# Patient Record
Sex: FEMALE | Race: WHITE | NOT HISPANIC OR LATINO | Employment: UNEMPLOYED | URBAN - METROPOLITAN AREA
[De-identification: names, ages, dates, MRNs, and addresses within clinical notes are randomized per-mention and may not be internally consistent; named-entity substitution may affect disease eponyms.]

---

## 2022-11-03 ENCOUNTER — HOSPITAL ENCOUNTER (OUTPATIENT)
Facility: HOSPITAL | Age: 72
Discharge: HOME | End: 2022-11-04
Attending: GENERAL PRACTICE | Admitting: FAMILY MEDICINE
Payer: MEDICARE

## 2022-11-03 DIAGNOSIS — K56.609 SMALL BOWEL OBSTRUCTION: ICD-10-CM

## 2022-11-03 DIAGNOSIS — K52.9 ENTERITIS: Primary | ICD-10-CM

## 2022-11-03 PROBLEM — I10 HTN (HYPERTENSION): Status: ACTIVE | Noted: 2022-11-03

## 2022-11-03 PROCEDURE — 96366 THER/PROPH/DIAG IV INF ADDON: CPT

## 2022-11-03 PROCEDURE — 25000003 PHARM REV CODE 250: Performed by: GENERAL PRACTICE

## 2022-11-03 PROCEDURE — 96372 THER/PROPH/DIAG INJ SC/IM: CPT | Performed by: FAMILY MEDICINE

## 2022-11-03 PROCEDURE — 96376 TX/PRO/DX INJ SAME DRUG ADON: CPT

## 2022-11-03 PROCEDURE — 96367 TX/PROPH/DG ADDL SEQ IV INF: CPT

## 2022-11-03 PROCEDURE — G0378 HOSPITAL OBSERVATION PER HR: HCPCS

## 2022-11-03 PROCEDURE — 96375 TX/PRO/DX INJ NEW DRUG ADDON: CPT

## 2022-11-03 PROCEDURE — 99285 EMERGENCY DEPT VISIT HI MDM: CPT | Mod: 25

## 2022-11-03 PROCEDURE — 63600175 PHARM REV CODE 636 W HCPCS: Performed by: FAMILY MEDICINE

## 2022-11-03 PROCEDURE — 94761 N-INVAS EAR/PLS OXIMETRY MLT: CPT

## 2022-11-03 PROCEDURE — 25000003 PHARM REV CODE 250: Performed by: FAMILY MEDICINE

## 2022-11-03 PROCEDURE — 63600175 PHARM REV CODE 636 W HCPCS: Performed by: GENERAL PRACTICE

## 2022-11-03 PROCEDURE — S0030 INJECTION, METRONIDAZOLE: HCPCS | Performed by: GENERAL PRACTICE

## 2022-11-03 PROCEDURE — 96365 THER/PROPH/DIAG IV INF INIT: CPT

## 2022-11-03 RX ORDER — SODIUM CHLORIDE 9 MG/ML
1000 INJECTION, SOLUTION INTRAVENOUS CONTINUOUS
Status: ACTIVE | OUTPATIENT
Start: 2022-11-03 | End: 2022-11-03

## 2022-11-03 RX ORDER — SODIUM CHLORIDE 9 MG/ML
1000 INJECTION, SOLUTION INTRAVENOUS
Status: COMPLETED | OUTPATIENT
Start: 2022-11-03 | End: 2022-11-03

## 2022-11-03 RX ORDER — ENOXAPARIN SODIUM 100 MG/ML
40 INJECTION SUBCUTANEOUS EVERY 24 HOURS
Status: DISCONTINUED | OUTPATIENT
Start: 2022-11-03 | End: 2022-11-03

## 2022-11-03 RX ORDER — ALPRAZOLAM 0.25 MG/1
0.25 TABLET ORAL ONCE
Status: COMPLETED | OUTPATIENT
Start: 2022-11-03 | End: 2022-11-03

## 2022-11-03 RX ORDER — FAMOTIDINE 20 MG/1
20 TABLET, FILM COATED ORAL 2 TIMES DAILY
Status: DISCONTINUED | OUTPATIENT
Start: 2022-11-03 | End: 2022-11-04 | Stop reason: HOSPADM

## 2022-11-03 RX ORDER — TALC
6 POWDER (GRAM) TOPICAL NIGHTLY PRN
Status: DISCONTINUED | OUTPATIENT
Start: 2022-11-03 | End: 2022-11-04 | Stop reason: HOSPADM

## 2022-11-03 RX ORDER — LOSARTAN POTASSIUM 50 MG/1
50 TABLET ORAL 2 TIMES DAILY
COMMUNITY

## 2022-11-03 RX ORDER — LANOLIN ALCOHOL/MO/W.PET/CERES
1000 CREAM (GRAM) TOPICAL DAILY
COMMUNITY

## 2022-11-03 RX ORDER — ENOXAPARIN SODIUM 100 MG/ML
40 INJECTION SUBCUTANEOUS EVERY 24 HOURS
Status: DISCONTINUED | OUTPATIENT
Start: 2022-11-03 | End: 2022-11-04 | Stop reason: HOSPADM

## 2022-11-03 RX ORDER — ONDANSETRON 2 MG/ML
4 INJECTION INTRAMUSCULAR; INTRAVENOUS
Status: COMPLETED | OUTPATIENT
Start: 2022-11-03 | End: 2022-11-03

## 2022-11-03 RX ORDER — ONDANSETRON 4 MG/1
4 TABLET, FILM COATED ORAL EVERY 6 HOURS PRN
COMMUNITY

## 2022-11-03 RX ORDER — MORPHINE SULFATE 2 MG/ML
2 INJECTION, SOLUTION INTRAMUSCULAR; INTRAVENOUS EVERY 4 HOURS PRN
Status: DISCONTINUED | OUTPATIENT
Start: 2022-11-03 | End: 2022-11-04 | Stop reason: HOSPADM

## 2022-11-03 RX ORDER — CHOLECALCIFEROL (VITAMIN D3) 25 MCG
50000 TABLET ORAL
COMMUNITY

## 2022-11-03 RX ORDER — CIPROFLOXACIN 2 MG/ML
400 INJECTION, SOLUTION INTRAVENOUS
Status: DISCONTINUED | OUTPATIENT
Start: 2022-11-03 | End: 2022-11-04 | Stop reason: HOSPADM

## 2022-11-03 RX ORDER — METRONIDAZOLE 500 MG/100ML
500 INJECTION, SOLUTION INTRAVENOUS
Status: DISCONTINUED | OUTPATIENT
Start: 2022-11-03 | End: 2022-11-04 | Stop reason: HOSPADM

## 2022-11-03 RX ORDER — SODIUM CHLORIDE 0.9 % (FLUSH) 0.9 %
10 SYRINGE (ML) INJECTION
Status: DISCONTINUED | OUTPATIENT
Start: 2022-11-03 | End: 2022-11-04 | Stop reason: HOSPADM

## 2022-11-03 RX ORDER — ALPRAZOLAM 0.25 MG/1
TABLET ORAL DAILY
COMMUNITY

## 2022-11-03 RX ORDER — ONDANSETRON 2 MG/ML
4 INJECTION INTRAMUSCULAR; INTRAVENOUS EVERY 8 HOURS PRN
Status: DISCONTINUED | OUTPATIENT
Start: 2022-11-03 | End: 2022-11-04 | Stop reason: HOSPADM

## 2022-11-03 RX ORDER — HYDRALAZINE HYDROCHLORIDE 20 MG/ML
10 INJECTION INTRAMUSCULAR; INTRAVENOUS EVERY 6 HOURS PRN
Status: DISCONTINUED | OUTPATIENT
Start: 2022-11-03 | End: 2022-11-04 | Stop reason: HOSPADM

## 2022-11-03 RX ORDER — AMLODIPINE BESYLATE 5 MG/1
5 TABLET ORAL DAILY
COMMUNITY

## 2022-11-03 RX ADMIN — SODIUM CHLORIDE 1000 ML: 9 INJECTION, SOLUTION INTRAVENOUS at 10:11

## 2022-11-03 RX ADMIN — ALPRAZOLAM 0.25 MG: 0.25 TABLET ORAL at 09:11

## 2022-11-03 RX ADMIN — METRONIDAZOLE 500 MG: 5 INJECTION, SOLUTION INTRAVENOUS at 01:11

## 2022-11-03 RX ADMIN — ONDANSETRON 4 MG: 2 INJECTION INTRAMUSCULAR; INTRAVENOUS at 10:11

## 2022-11-03 RX ADMIN — SODIUM CHLORIDE 1000 ML: 9 INJECTION, SOLUTION INTRAVENOUS at 01:11

## 2022-11-03 RX ADMIN — ONDANSETRON HYDROCHLORIDE 4 MG: 2 SOLUTION INTRAMUSCULAR; INTRAVENOUS at 11:11

## 2022-11-03 RX ADMIN — METRONIDAZOLE 500 MG: 5 INJECTION, SOLUTION INTRAVENOUS at 08:11

## 2022-11-03 RX ADMIN — ENOXAPARIN SODIUM 40 MG: 40 INJECTION SUBCUTANEOUS at 06:11

## 2022-11-03 RX ADMIN — CIPROFLOXACIN 400 MG: 2 INJECTION, SOLUTION INTRAVENOUS at 03:11

## 2022-11-03 NOTE — SUBJECTIVE & OBJECTIVE
Past Medical History:   Diagnosis Date    Colon cancer     HTN (hypertension)     Small bowel obstruction        Past Surgical History:   Procedure Laterality Date    COLOSTOMY  01/11/2011    DR. Alejo Morel pt resports reason was colon CA    SMALL INTESTINE SURGERY  2006       Review of patient's allergies indicates:  No Known Allergies    No current facility-administered medications on file prior to encounter.     Current Outpatient Medications on File Prior to Encounter   Medication Sig    ALPRAZolam (XANAX) 0.25 MG tablet Take by mouth once daily.    amLODIPine (NORVASC) 5 MG tablet Take 5 mg by mouth once daily.    cyanocobalamin (VITAMIN B-12) 1000 MCG tablet Take 1,000 mcg by mouth once daily. 1000 mcg/ml   Administer 1 ml IM monthly    losartan (COZAAR) 50 MG tablet Take 50 mg by mouth 2 (two) times a day.    ondansetron (ZOFRAN) 4 MG tablet Take 4 mg by mouth every 6 (six) hours as needed for Nausea.    vitamin D (VITAMIN D3) 1000 units Tab Take 50,000 Units by mouth every 7 days.     Family History       Problem Relation (Age of Onset)    Hypertension Mother    No Known Problems Maternal Uncle          Tobacco Use    Smoking status: Never    Smokeless tobacco: Never   Substance and Sexual Activity    Alcohol use: Never    Drug use: Never    Sexual activity: Not Currently     Review of Systems   Constitutional:  Positive for appetite change. Negative for activity change, diaphoresis, fatigue and fever.   HENT:  Negative for congestion, rhinorrhea, sinus pain and sneezing.    Eyes:  Negative for pain and redness.   Respiratory:  Negative for cough, chest tightness and shortness of breath.    Cardiovascular:  Negative for chest pain, palpitations and leg swelling.   Gastrointestinal:  Positive for abdominal distention, abdominal pain and nausea. Negative for blood in stool, constipation, diarrhea and vomiting.   Endocrine: Negative for polydipsia and polyphagia.   Genitourinary:  Negative for dysuria and  hematuria.   Musculoskeletal:  Negative for arthralgias and back pain.   Skin:  Negative for rash and wound.   Neurological:  Negative for tremors, seizures and weakness.   Psychiatric/Behavioral:  Negative for behavioral problems, dysphoric mood and sleep disturbance.    Objective:     Vital Signs (Most Recent):  Temp: 98.1 °F (36.7 °C) (11/03/22 1016)  Pulse: 84 (11/03/22 1206)  Resp: 18 (11/03/22 1016)  BP: (!) 167/79 (11/03/22 1206)  SpO2: 96 % (11/03/22 1206)   Vital Signs (24h Range):  Temp:  [98.1 °F (36.7 °C)] 98.1 °F (36.7 °C)  Pulse:  [] 84  Resp:  [18] 18  SpO2:  [95 %-97 %] 96 %  BP: (149-169)/(70-86) 167/79     Weight: 96.2 kg (212 lb)  Body mass index is 36.39 kg/m².    Physical Exam  Constitutional:       General: She is not in acute distress.     Appearance: Normal appearance. She is not ill-appearing.   HENT:      Head: Normocephalic and atraumatic.      Right Ear: External ear normal.      Left Ear: External ear normal.      Nose: Nose normal. No congestion or rhinorrhea.      Mouth/Throat:      Mouth: Mucous membranes are moist.      Pharynx: Oropharynx is clear. No oropharyngeal exudate.   Eyes:      General: No scleral icterus.     Conjunctiva/sclera: Conjunctivae normal.   Cardiovascular:      Rate and Rhythm: Normal rate and regular rhythm.      Pulses: Normal pulses.      Heart sounds: Normal heart sounds.   Pulmonary:      Effort: Pulmonary effort is normal. No respiratory distress.      Breath sounds: Normal breath sounds. No stridor.   Abdominal:      General: There is distension.      Palpations: Abdomen is soft. There is no mass.      Tenderness: There is abdominal tenderness (mild medial to colosstomy RLQ). There is no right CVA tenderness, left CVA tenderness, guarding or rebound.      Hernia: A hernia (small surrounding colostomy) is present.   Musculoskeletal:         General: No swelling or tenderness. Normal range of motion.      Cervical back: No rigidity or tenderness.    Skin:     General: Skin is warm and dry.      Coloration: Skin is not jaundiced.      Findings: No erythema or rash.   Neurological:      General: No focal deficit present.      Mental Status: She is alert and oriented to person, place, and time.      Sensory: No sensory deficit.      Motor: No weakness.   Psychiatric:         Mood and Affect: Mood normal.         Behavior: Behavior normal.         Thought Content: Thought content normal.         Judgment: Judgment normal.           Significant Labs: All pertinent labs within the past 24 hours have been reviewed.       Significant Imaging: I have reviewed all pertinent imaging results/findings within the past 24 hours.

## 2022-11-03 NOTE — ED PROVIDER NOTES
Encounter Date: 11/3/2022       History     Chief Complaint   Patient presents with    GI Problem     Stomach pain near colostomy site since 2am.  Transfer from [facility].      Transfer from [facility], and accepted by Dr. Edd Long.  Patient has a partial small-bowel obstruction versus ileus.  History of abdominal surgery in the past.  She presented to the initial facility with complaints of intractable nausea vomiting. Stomach pain near colostomy site since 2am.  Transfer from [facility].  Patient has history of colon cancer status post multiple resections with eventual near-total colectomy with placement of colostomy in 2017.  Patient reports being cancer free since then    The history is provided by the patient and the EMS personnel. No  was used.   Emesis   This is a new problem. The current episode started just prior to arrival. The problem occurs 2 - 4 times per day. The problem has been gradually worsening. The emesis has an appearance of stomach contents. Associated symptoms include abdominal pain.   Review of patient's allergies indicates:  No Known Allergies  No past medical history on file.  No past surgical history on file.  No family history on file.     Review of Systems   Constitutional: Negative.    HENT: Negative.     Eyes: Negative.    Respiratory: Negative.     Cardiovascular: Negative.    Gastrointestinal:  Positive for abdominal pain and vomiting.   Endocrine: Negative.    Genitourinary: Negative.    Musculoskeletal: Negative.    Skin: Negative.    Allergic/Immunologic: Negative.    Neurological: Negative.    Hematological: Negative.    Psychiatric/Behavioral: Negative.     All other systems reviewed and are negative.    Physical Exam     Initial Vitals [11/03/22 1016]   BP Pulse Resp Temp SpO2   (!) 169/84 104 18 98.1 °F (36.7 °C) 95 %      MAP       --         Physical Exam    Nursing note and vitals reviewed.  Constitutional: She  appears well-developed and well-nourished.   HENT:   Head: Normocephalic and atraumatic.   Eyes: EOM are normal. Pupils are equal, round, and reactive to light.   Neck: Neck supple.   Normal range of motion.  Cardiovascular:  Normal rate, regular rhythm, normal heart sounds and intact distal pulses.           Pulmonary/Chest: Breath sounds normal.   Abdominal: Abdomen is soft. Bowel sounds are normal.   Musculoskeletal:         General: Normal range of motion.      Cervical back: Normal range of motion and neck supple.     Neurological: She is alert and oriented to person, place, and time. She has normal strength. GCS score is 15. GCS eye subscore is 4. GCS verbal subscore is 5. GCS motor subscore is 6.   Skin: Skin is warm and dry.   Psychiatric: She has a normal mood and affect. Her behavior is normal. Judgment and thought content normal.       ED Course   Procedures  Labs Reviewed - No data to display       Imaging Results    None          Medications   0.9%  NaCl infusion (1,000 mLs Intravenous New Bag 11/3/22 1014)   ondansetron injection 4 mg (4 mg Intravenous Given 11/3/22 1151)     Medical Decision Making:   Clinical Tests:   Lab Tests: Ordered and Reviewed  Radiological Study: Ordered and Reviewed  ED Management:  All work[place] been [place].  Patient has a mildly elevated white count, and initial preliminary CT report discusses low-grade small-bowel obstruction versus ileus.  However the final read from the radiologist at [facility] shows that only enteritis is present.  Patient is currently stable.  I discussed this case with Dr. Loya who is the hospitalist, and she will be coming to see the patient.  Other:   I have discussed this case with another health care provider.       <> Summary of the Discussion: Dr. Loya came to see the patient                        Clinical Impression:   Final diagnoses:  [K52.9] Enteritis (Primary)  [K56.609] Small bowel obstruction      ED  Disposition Condition    Observation Stable                Bhupendra Roca MD  11/03/22 1226       Bhupendra Roca MD  11/03/22 1227       Bhupendra Roca MD  11/03/22 1228       Bhupendra Roca MD  11/03/22 1228       Bhupendra Roca MD  11/03/22 1234

## 2022-11-03 NOTE — H&P
[facility]  [facility] Medicine  History & Physical    Patient Name: Nasrin Bryant  MRN: [id number]  Patient Class: OP- Observation  Admission Date: 11/3/2022  Attending Physician: Caitlyn Loya MD   Primary Care Provider: No primary care provider on file.         Patient information was obtained from patient, past medical records, ER records and primary team.     Subjective:     Principal Problem:Small bowel obstruction    Chief Complaint:   Chief Complaint   Patient presents with    GI Problem     Stomach pain near colostomy site since 2am.  Transfer from [facility].         HPI: 70 yo WF followed by Dr. Dhillon in [place] has a h/o colostomy and colon resection SBO x 2 once with surgery, appendectomy, hydterectomy, cholecystectomy  She presented to [facility] today was with severe sharp and stabbing squeezing pain around the colostomy started around 0200, pain did not improve early am so she came to ER for evaluation.  In ER there she had CT showed possible SBO< [place] does not have surgery coverage because DR Dhillon is out of town.  The [facility] transfer center called DR. Long who spoke with HENRY in [place] and accepted the pt for transfer. Upon arrival to [facility] ER DR. Roca evaluated pt and agreed with need for admission.  Since arrival pt reports nausea, her pain has gone except for a small bit, and she has had output of stool in her colostomy which is consistent with pt normal Bms.    I spoke with Dr. Long and he requested pt admit to hospital medicine and asked for pt to have NGT placed.  Pt with PMHX HTN. Plan is for conservative managmeent of her possible bowel obstruction.       Past Medical History:   Diagnosis Date    Colon cancer     HTN (hypertension)     Small bowel obstruction        Past Surgical History:   Procedure Laterality Date    COLOSTOMY  01/11/2011    DR. Dhillon [place] pt resports reason was colon CA    SMALL INTESTINE SURGERY   2006       Review of patient's allergies indicates:  No Known Allergies    No current facility-administered medications on file prior to encounter.     Current Outpatient Medications on File Prior to Encounter   Medication Sig    ALPRAZolam (XANAX) 0.25 MG tablet Take by mouth once daily.    amLODIPine (NORVASC) 5 MG tablet Take 5 mg by mouth once daily.    cyanocobalamin (VITAMIN B-12) 1000 MCG tablet Take 1,000 mcg by mouth once daily. 1000 mcg/ml   Administer 1 ml IM monthly    losartan (COZAAR) 50 MG tablet Take 50 mg by mouth 2 (two) times a day.    ondansetron (ZOFRAN) 4 MG tablet Take 4 mg by mouth every 6 (six) hours as needed for Nausea.    vitamin D (VITAMIN D3) 1000 units Tab Take 50,000 Units by mouth every 7 days.     Family History       Problem Relation (Age of Onset)    Hypertension Mother    No Known Problems Maternal Uncle          Tobacco Use    Smoking status: Never    Smokeless tobacco: Never   Substance and Sexual Activity    Alcohol use: Never    Drug use: Never    Sexual activity: Not Currently     Review of Systems   Constitutional:  Positive for appetite change. Negative for activity change, diaphoresis, fatigue and fever.   HENT:  Negative for congestion, rhinorrhea, sinus pain and sneezing.    Eyes:  Negative for pain and redness.   Respiratory:  Negative for cough, chest tightness and shortness of breath.    Cardiovascular:  Negative for chest pain, palpitations and leg swelling.   Gastrointestinal:  Positive for abdominal distention, abdominal pain and nausea. Negative for blood in stool, constipation, diarrhea and vomiting.   Endocrine: Negative for polydipsia and polyphagia.   Genitourinary:  Negative for dysuria and hematuria.   Musculoskeletal:  Negative for arthralgias and back pain.   Skin:  Negative for rash and wound.   Neurological:  Negative for tremors, seizures and weakness.   Psychiatric/Behavioral:  Negative for behavioral problems, dysphoric mood and sleep disturbance.     Objective:     Vital Signs (Most Recent):  Temp: 98.1 °F (36.7 °C) (11/03/22 1016)  Pulse: 84 (11/03/22 1206)  Resp: 18 (11/03/22 1016)  BP: (!) 167/79 (11/03/22 1206)  SpO2: 96 % (11/03/22 1206)   Vital Signs (24h Range):  Temp:  [98.1 °F (36.7 °C)] 98.1 °F (36.7 °C)  Pulse:  [] 84  Resp:  [18] 18  SpO2:  [95 %-97 %] 96 %  BP: (149-169)/(70-86) 167/79     Weight: 96.2 kg (212 lb)  Body mass index is 36.39 kg/m².    Physical Exam  Constitutional:       General: She is not in acute distress.     Appearance: Normal appearance. She is not ill-appearing.   HENT:      Head: Normocephalic and atraumatic.      Right Ear: External ear normal.      Left Ear: External ear normal.      Nose: Nose normal. No congestion or rhinorrhea.      Mouth/Throat:      Mouth: Mucous membranes are moist.      Pharynx: Oropharynx is clear. No oropharyngeal exudate.   Eyes:      General: No scleral icterus.     Conjunctiva/sclera: Conjunctivae normal.   Cardiovascular:      Rate and Rhythm: Normal rate and regular rhythm.      Pulses: Normal pulses.      Heart sounds: Normal heart sounds.   Pulmonary:      Effort: Pulmonary effort is normal. No respiratory distress.      Breath sounds: Normal breath sounds. No stridor.   Abdominal:      General: There is distension.      Palpations: Abdomen is soft. There is no mass.      Tenderness: There is abdominal tenderness (mild medial to colosstomy RLQ). There is no right CVA tenderness, left CVA tenderness, guarding or rebound.      Hernia: A hernia (small surrounding colostomy) is present.   Musculoskeletal:         General: No swelling or tenderness. Normal range of motion.      Cervical back: No rigidity or tenderness.   Skin:     General: Skin is warm and dry.      Coloration: Skin is not jaundiced.      Findings: No erythema or rash.   Neurological:      General: No focal deficit present.      Mental Status: She is alert and oriented to person, place, and time.      Sensory: No sensory  deficit.      Motor: No weakness.   Psychiatric:         Mood and Affect: Mood normal.         Behavior: Behavior normal.         Thought Content: Thought content normal.         Judgment: Judgment normal.           Significant Labs: All pertinent labs within the past 24 hours have been reviewed.       Significant Imaging: I have reviewed all pertinent imaging results/findings within the past 24 hours.       Assessment/Plan:     * Small bowel obstruction  Admit to Inpatient  Consult surgery  Iv abx cipro and flagyl  Place NGT      Enteritis     cipro and flagyl iv      HTN (hypertension)  Holding all home meds  Monitor BP  Prn hydralazine until tolerating po        VTE Risk Mitigation (From admission, onward)           Ordered     enoxaparin injection 40 mg  Daily         11/03/22 1240     IP VTE HIGH RISK PATIENT  Once         11/03/22 1240     Place sequential compression device  Until discontinued         11/03/22 1240                   Clarification: pt is placed in observation status    Caitlyn Loya MD  Department of Hospital Medicine   [facility] - Emergency Dept

## 2022-11-03 NOTE — HPI
72 yo WF followed by Dr. Dhillon in [place] has a h/o colostomy and colon resection SBO x 2 once with surgery, appendectomy, hydterectomy, cholecystectomy  She presented to [place] [facility] today was with severe sharp and stabbing squeezing pain around the colostomy started around 0200, pain did not improve early am so she came to ER for evaluation.  In ER there she had CT showed possible SBO< [place] does not have surgery coverage because DR Dhillon is out of town.  The [facility] transfer center called DR. Long who spoke with HENRY in [place] and accepted the pt for transfer. Upon arrival to [facility] ER DR. Roca evaluated pt and agreed with need for admission.  Since arrival pt reports nausea, her pain has gone except for a small bit, and she has had output of stool in her colostomy which is consistent with pt normal Bms.    I spoke with Dr. Long and he requested pt admit to hospital medicine and asked for pt to have NGT placed.  Pt with PMHX HTN. Plan is for conservative managmeent of her possible bowel obstruction.

## 2022-11-04 VITALS
DIASTOLIC BLOOD PRESSURE: 77 MMHG | RESPIRATION RATE: 18 BRPM | HEART RATE: 80 BPM | OXYGEN SATURATION: 97 % | WEIGHT: 235 LBS | SYSTOLIC BLOOD PRESSURE: 124 MMHG | TEMPERATURE: 98 F | HEIGHT: 64 IN | BODY MASS INDEX: 40.12 KG/M2

## 2022-11-04 LAB
ALBUMIN SERPL-MCNC: 3.4 GM/DL (ref 3.4–4.8)
ALBUMIN/GLOB SERPL: 1.3 RATIO (ref 1.1–2)
ALP SERPL-CCNC: 58 UNIT/L (ref 40–150)
ALT SERPL-CCNC: 13 UNIT/L (ref 0–55)
AST SERPL-CCNC: 12 UNIT/L (ref 5–34)
BASOPHILS # BLD AUTO: 0.05 X10(3)/MCL (ref 0–0.2)
BASOPHILS NFR BLD AUTO: 0.6 %
BILIRUBIN DIRECT+TOT PNL SERPL-MCNC: 0.7 MG/DL
BUN SERPL-MCNC: 15 MG/DL (ref 9.8–20.1)
CALCIUM SERPL-MCNC: 8.9 MG/DL (ref 8.4–10.2)
CHLORIDE SERPL-SCNC: 110 MMOL/L (ref 98–107)
CO2 SERPL-SCNC: 24 MMOL/L (ref 23–31)
CREAT SERPL-MCNC: 0.96 MG/DL (ref 0.55–1.02)
EOSINOPHIL # BLD AUTO: 0.11 X10(3)/MCL (ref 0–0.9)
EOSINOPHIL NFR BLD AUTO: 1.3 %
ERYTHROCYTE [DISTWIDTH] IN BLOOD BY AUTOMATED COUNT: 13.6 % (ref 11.5–17)
GFR SERPLBLD CREATININE-BSD FMLA CKD-EPI: >60 MLS/MIN/1.73/M2
GLOBULIN SER-MCNC: 2.7 GM/DL (ref 2.4–3.5)
GLUCOSE SERPL-MCNC: 125 MG/DL (ref 82–115)
HCT VFR BLD AUTO: 37.2 % (ref 37–47)
HGB BLD-MCNC: 11.7 GM/DL (ref 12–16)
IMM GRANULOCYTES # BLD AUTO: 0.03 X10(3)/MCL (ref 0–0.04)
IMM GRANULOCYTES NFR BLD AUTO: 0.3 %
LYMPHOCYTES # BLD AUTO: 1.87 X10(3)/MCL (ref 0.6–4.6)
LYMPHOCYTES NFR BLD AUTO: 21.5 %
MCH RBC QN AUTO: 28.6 PG (ref 27–31)
MCHC RBC AUTO-ENTMCNC: 31.5 MG/DL (ref 33–36)
MCV RBC AUTO: 91 FL (ref 80–94)
MONOCYTES # BLD AUTO: 0.62 X10(3)/MCL (ref 0.1–1.3)
MONOCYTES NFR BLD AUTO: 7.1 %
NEUTROPHILS # BLD AUTO: 6 X10(3)/MCL (ref 2.1–9.2)
NEUTROPHILS NFR BLD AUTO: 69.2 %
NRBC BLD AUTO-RTO: 0 %
PLATELET # BLD AUTO: 287 X10(3)/MCL (ref 130–400)
PMV BLD AUTO: 9.6 FL (ref 7.4–10.4)
POTASSIUM SERPL-SCNC: 3.8 MMOL/L (ref 3.5–5.1)
PROT SERPL-MCNC: 6.1 GM/DL (ref 5.8–7.6)
RBC # BLD AUTO: 4.09 X10(6)/MCL (ref 4.2–5.4)
SODIUM SERPL-SCNC: 140 MMOL/L (ref 136–145)
WBC # SPEC AUTO: 8.7 X10(3)/MCL (ref 4.5–11.5)

## 2022-11-04 PROCEDURE — 25000003 PHARM REV CODE 250: Performed by: GENERAL PRACTICE

## 2022-11-04 PROCEDURE — 63600175 PHARM REV CODE 636 W HCPCS: Performed by: GENERAL PRACTICE

## 2022-11-04 PROCEDURE — 85025 COMPLETE CBC W/AUTO DIFF WBC: CPT | Performed by: GENERAL PRACTICE

## 2022-11-04 PROCEDURE — 80053 COMPREHEN METABOLIC PANEL: CPT | Performed by: SURGERY

## 2022-11-04 PROCEDURE — S0030 INJECTION, METRONIDAZOLE: HCPCS | Performed by: GENERAL PRACTICE

## 2022-11-04 PROCEDURE — G0378 HOSPITAL OBSERVATION PER HR: HCPCS

## 2022-11-04 PROCEDURE — 94761 N-INVAS EAR/PLS OXIMETRY MLT: CPT

## 2022-11-04 PROCEDURE — 36415 COLL VENOUS BLD VENIPUNCTURE: CPT | Performed by: SURGERY

## 2022-11-04 PROCEDURE — 96366 THER/PROPH/DIAG IV INF ADDON: CPT

## 2022-11-04 RX ORDER — CIPROFLOXACIN 500 MG/1
500 TABLET ORAL EVERY 12 HOURS
Qty: 20 TABLET | Refills: 0 | Status: SHIPPED | OUTPATIENT
Start: 2022-11-04 | End: 2022-11-14

## 2022-11-04 RX ORDER — METRONIDAZOLE 500 MG/1
500 TABLET ORAL 3 TIMES DAILY
Qty: 30 TABLET | Refills: 0 | Status: SHIPPED | OUTPATIENT
Start: 2022-11-04 | End: 2022-11-14

## 2022-11-04 RX ADMIN — CIPROFLOXACIN 400 MG: 2 INJECTION, SOLUTION INTRAVENOUS at 03:11

## 2022-11-04 RX ADMIN — METRONIDAZOLE 500 MG: 5 INJECTION, SOLUTION INTRAVENOUS at 04:11

## 2022-11-04 RX ADMIN — FAMOTIDINE 20 MG: 20 TABLET ORAL at 09:11

## 2022-11-04 NOTE — CONSULTS
[facility] - Medical Surgical Unit  General Surgery  Consult Note    Consults  Subjective:     Chief Complaint/Reason for Admission:  Transfer for evaluation of possible partial small-bowel obstruction    History of Present Illness:  71-year-old white female transferred from [facility] for evaluation of possible partial small-bowel obstruction.  Patient does have history of colorectal cancer initially excised in 2001 with recurrence and obstruction in 2011.  Since that time she is had a colostomy bag.  A she is not had a secondary obstruction since that time.  She states that yesterday she began to have some abdominal cramping and discomfort with some nausea and vomiting.  She denied any recent sick contacts fevers night sweats and chills.  She making their normal bowel function.  A CT scan was performed at [place] which revealed a gas bowel pattern with possible changes consistent with a partial small-bowel obstruction.  Since being admitted yesterday evening she is had complete return of bowel function.  She was started on a clear liquid diet this morning which has been tolerated.  Her ostomy output bag has been filled on 4 different occasions at this time.  She denies abdominal pain discomfort at this time.  She denies nausea vomiting.  She states that she has a appetite.    No current facility-administered medications on file prior to encounter.     Current Outpatient Medications on File Prior to Encounter   Medication Sig    amLODIPine (NORVASC) 5 MG tablet Take 5 mg by mouth once daily.    losartan (COZAAR) 50 MG tablet Take 50 mg by mouth 2 (two) times a day.    ondansetron (ZOFRAN) 4 MG tablet Take 4 mg by mouth every 6 (six) hours as needed for Nausea.    vitamin D (VITAMIN D3) 1000 units Tab Take 50,000 Units by mouth every 7 days.    ALPRAZolam (XANAX) 0.25 MG tablet Take by mouth once daily.    cyanocobalamin (VITAMIN B-12) 1000 MCG tablet Take 1,000 mcg by mouth once daily.  1000 mcg/ml   Administer 1 ml IM monthly       Review of patient's allergies indicates:  No Known Allergies    Past Medical History:   Diagnosis Date    Colon cancer     HTN (hypertension)     Small bowel obstruction      Past Surgical History:   Procedure Laterality Date    COLOSTOMY  01/11/2011    DR. Alejo Morel pt resports reason was colon CA    SMALL INTESTINE SURGERY  2006     Family History       Problem Relation (Age of Onset)    Hypertension Mother    No Known Problems Maternal Uncle          Tobacco Use    Smoking status: Never    Smokeless tobacco: Never   Substance and Sexual Activity    Alcohol use: Never    Drug use: Never    Sexual activity: Not Currently     Review of Systems   Gastrointestinal:  Positive for nausea and vomiting. Negative for abdominal distention, abdominal pain, blood in stool, constipation, diarrhea and rectal pain.   Objective:     Vital Signs (Most Recent):  Temp: 98.2 °F (36.8 °C) (11/04/22 1200)  Pulse: 80 (11/04/22 1200)  Resp: 18 (11/04/22 1200)  BP: 124/77 (11/04/22 1200)  SpO2: 97 % (11/04/22 1200) Vital Signs (24h Range):  Temp:  [97.9 °F (36.6 °C)-99.1 °F (37.3 °C)] 98.2 °F (36.8 °C)  Pulse:  [70-90] 80  Resp:  [18-20] 18  SpO2:  [94 %-97 %] 97 %  BP: (113-146)/(55-77) 124/77     Weight: 106.6 kg (235 lb 0.2 oz)  Body mass index is 40.34 kg/m².      Intake/Output Summary (Last 24 hours) at 11/4/2022 1330  Last data filed at 11/3/2022 1700  Gross per 24 hour   Intake 120 ml   Output --   Net 120 ml       Physical Exam  Abdominal:      General: Abdomen is flat. The ostomy site is clean. Bowel sounds are normal. There is no distension or abdominal bruit. There are no signs of injury.      Palpations: Abdomen is soft. There is no shifting dullness, fluid wave, hepatomegaly, splenomegaly, mass or pulsatile mass.      Tenderness: There is no abdominal tenderness.          Comments: Patient has a reducible parastomal hernia noted in the left lower quadrant       Significant  Labs:  CBC:   Recent Labs   Lab 11/04/22  0506   WBC 8.7   RBC 4.09*   HGB 11.7*   HCT 37.2      MCV 91.0   MCH 28.6   MCHC 31.5*     CMP:   Recent Labs   Lab 11/04/22  0506   CALCIUM 8.9   ALBUMIN 3.4      K 3.8   CO2 24   BUN 15.0   CREATININE 0.96   ALKPHOS 58   ALT 13   AST 12   BILITOT 0.7       Significant Diagnostics:  CT: I have reviewed all pertinent results/findings within the past 24 hours.  Nonobstructive appearing bowel gas pattern, mild dilation throughout all segment loops but no decompressed or free fluid noted.    Assessment/Plan:     Active Diagnoses:    Diagnosis Date Noted POA    PRINCIPAL PROBLEM:  Small bowel obstruction [K56.609] 11/03/2022 Yes    Enteritis [K52.9] 11/03/2022 Yes    HTN (hypertension) [I10] 11/03/2022 Yes      Problems Resolved During this Admission:   At this time patient appears to be fully recovered from enteritis versus partial small bowel obstruction.  Nausea and vomiting has ceased and ostomy output has returned to its normal baseline.  Patient has tolerated a liquid diet as of this time.  She appears to be stable for discharge to home.  Recommend patient be evaluated by primary care physician upon returning home.    Thank you for your consult. I will sign off. Please contact us if you have any additional questions.    Wendy Long MD  General Surgery  [facility] Surgical Unit

## 2022-11-04 NOTE — PLAN OF CARE
Problem: Adult Inpatient Plan of Care  Goal: Plan of Care Review  11/4/2022 1336 by Tisha Coyle RN  Outcome: Met  11/4/2022 0932 by Tisha Coyle RN  Outcome: Ongoing, Progressing  Goal: Patient-Specific Goal (Individualized)  11/4/2022 1336 by Tisha Coyle RN  Outcome: Met  11/4/2022 0932 by Tisha Coyle RN  Outcome: Ongoing, Progressing  Goal: Absence of Hospital-Acquired Illness or Injury  11/4/2022 1336 by Tisha Coyle RN  Outcome: Met  11/4/2022 0932 by Tisha Coyle RN  Outcome: Ongoing, Progressing  Goal: Optimal Comfort and Wellbeing  11/4/2022 1336 by Tisha Coyle RN  Outcome: Met  11/4/2022 0932 by Tisha Coyle RN  Outcome: Ongoing, Progressing  Goal: Readiness for Transition of Care  11/4/2022 1336 by Tisha Coyle RN  Outcome: Met  11/4/2022 0932 by Tisha Coyle RN  Outcome: Ongoing, Progressing

## 2022-11-04 NOTE — PROGRESS NOTES
[facility]  [facility] Medicine  Progress Note    Patient Name: Nasrin Bryant  MRN: [id number]  Patient Class: OP- Observation   Admission Date: 11/3/2022  Length of Stay: 0 days  Attending Physician: Caitlyn Loya MD  Primary Care Provider: No primary care provider on file.        Subjective:     Principal Problem:Small bowel obstruction        HPI:  72 yo WF followed by Dr. Dhillon in [place] has a h/o colostomy and colon resection SBO x 2 once with surgery, appendectomy, hydterectomy, cholecystectomy  She presented to [place] [facility] today was with severe sharp and stabbing squeezing pain around the colostomy started around 0200, pain did not improve early am so she came to ER for evaluation.  In ER there she had CT showed possible SBO< [place] does not have surgery coverage because DR Dhillon is out of town.  The [facility] transfer center called DR. Long who spoke with [facility] in [place] and accepted the pt for transfer. Upon arrival to [facility] ER DR. Roca evaluated pt and agreed with need for admission.  Since arrival pt reports nausea, her pain has gone except for a small bit, and she has had output of stool in her colostomy which is consistent with pt normal Bms.    I spoke with Dr. Long and he requested pt admit to hospital medicine and asked for pt to have NGT placed.  Pt with PMHX HTN. Plan is for conservative managmeent of her possible bowel obstruction.       Overview/Hospital Course:  11/4/22-Patient seems to be a lot better today.  She does not know why she was admitted but I discussed with her that the ED in [place] felt as though you had a SBO and needed surgical evaluation.  Final report as per the ED records just mention enteritis so I am also confused as to why she was sent here.  She denies any N/V.  She refused NG tube as well.  She has been started on a clear liquid diet as per surgery.  No consult in chart as of yet.  If she is able to tolerate the  progression of the diet then she can be discharged later today.        Interval History:     Review of Systems   Constitutional: Negative.    HENT: Negative.     Eyes: Negative.    Respiratory: Negative.     Cardiovascular: Negative.    Gastrointestinal: Negative.    Endocrine: Negative.    Genitourinary: Negative.    Musculoskeletal: Negative.    Skin: Negative.    Allergic/Immunologic: Negative.    Neurological: Negative.    Hematological: Negative.    Psychiatric/Behavioral: Negative.     Objective:     Vital Signs (Most Recent):  Temp: 99.1 °F (37.3 °C) (11/04/22 0400)  Pulse: 83 (11/04/22 0544)  Resp: 18 (11/04/22 0544)  BP: (!) 114/55 (11/04/22 0400)  SpO2: (!) 94 % (11/04/22 0544)   Vital Signs (24h Range):  Temp:  [97.9 °F (36.6 °C)-99.1 °F (37.3 °C)] 99.1 °F (37.3 °C)  Pulse:  [70-99] 83  Resp:  [18-20] 18  SpO2:  [94 %-98 %] 94 %  BP: (113-174)/(55-84) 114/55     Weight: 106.6 kg (235 lb 0.2 oz)  Body mass index is 40.34 kg/m².    Intake/Output Summary (Last 24 hours) at 11/4/2022 1227  Last data filed at 11/3/2022 1700  Gross per 24 hour   Intake 120 ml   Output --   Net 120 ml      Physical Exam  Constitutional:       Appearance: Normal appearance. She is normal weight.   HENT:      Head: Normocephalic and atraumatic.      Nose: Nose normal.      Mouth/Throat:      Mouth: Mucous membranes are moist.      Pharynx: Oropharynx is clear.   Eyes:      Extraocular Movements: Extraocular movements intact.      Conjunctiva/sclera: Conjunctivae normal.      Pupils: Pupils are equal, round, and reactive to light.   Cardiovascular:      Rate and Rhythm: Normal rate and regular rhythm.      Pulses: Normal pulses.      Heart sounds: Normal heart sounds.   Pulmonary:      Effort: Pulmonary effort is normal.      Breath sounds: Normal breath sounds.   Abdominal:      General: Bowel sounds are normal.      Palpations: Abdomen is soft.      Comments: colostomy   Musculoskeletal:         General: Normal range of motion.       Cervical back: Normal range of motion and neck supple.   Skin:     General: Skin is warm and dry.      Capillary Refill: Capillary refill takes 2 to 3 seconds.   Neurological:      General: No focal deficit present.      Mental Status: She is alert and oriented to person, place, and time. Mental status is at baseline.   Psychiatric:         Mood and Affect: Mood normal.         Behavior: Behavior normal.         Thought Content: Thought content normal.         Judgment: Judgment normal.       Significant Labs: All pertinent labs within the past 24 hours have been reviewed.  BMP:   Recent Labs   Lab 11/04/22  0506      K 3.8   CO2 24   BUN 15.0   CREATININE 0.96   CALCIUM 8.9     CBC:   Recent Labs   Lab 11/04/22  0506   WBC 8.7   HGB 11.7*   HCT 37.2        CMP:   Recent Labs   Lab 11/04/22  0506      K 3.8   CO2 24   BUN 15.0   CREATININE 0.96   CALCIUM 8.9   ALBUMIN 3.4   BILITOT 0.7   ALKPHOS 58   AST 12   ALT 13     Magnesium: No results for input(s): MG in the last 48 hours.    Significant Imaging: I have reviewed all pertinent imaging results/findings within the past 24 hours.      Assessment/Plan:      * Small bowel obstruction  Admit to Inpatient  Consult surgery  Iv abx cipro and flagyl  Place NGT      HTN (hypertension)  Holding all home meds  Monitor BP  Prn hydralazine until tolerating po      Enteritis     cipro and flagyl iv        VTE Risk Mitigation (From admission, onward)         Ordered     enoxaparin injection 40 mg  Daily         11/03/22 1742     IP VTE HIGH RISK PATIENT  Once         11/03/22 1240     Place sequential compression device  Until discontinued         11/03/22 1240              Clear liquid diet and advance as tolerated  Possible d/c later today if she tolerates diet  Discharge Planning   SAMIR:      Code Status: Full Code   Is the patient medically ready for discharge?:     Reason for patient still in hospital (select all that apply): Treatment, Consult  recommendations and Pending disposition  Discharge Plan A: Home                  Liu Montoya MD  Department of Hospital Medicine   [facility]

## 2022-11-04 NOTE — SUBJECTIVE & OBJECTIVE
Interval History:     Review of Systems   Constitutional: Negative.    HENT: Negative.     Eyes: Negative.    Respiratory: Negative.     Cardiovascular: Negative.    Gastrointestinal: Negative.    Endocrine: Negative.    Genitourinary: Negative.    Musculoskeletal: Negative.    Skin: Negative.    Allergic/Immunologic: Negative.    Neurological: Negative.    Hematological: Negative.    Psychiatric/Behavioral: Negative.     Objective:     Vital Signs (Most Recent):  Temp: 99.1 °F (37.3 °C) (11/04/22 0400)  Pulse: 83 (11/04/22 0544)  Resp: 18 (11/04/22 0544)  BP: (!) 114/55 (11/04/22 0400)  SpO2: (!) 94 % (11/04/22 0544)   Vital Signs (24h Range):  Temp:  [97.9 °F (36.6 °C)-99.1 °F (37.3 °C)] 99.1 °F (37.3 °C)  Pulse:  [70-99] 83  Resp:  [18-20] 18  SpO2:  [94 %-98 %] 94 %  BP: (113-174)/(55-84) 114/55     Weight: 106.6 kg (235 lb 0.2 oz)  Body mass index is 40.34 kg/m².    Intake/Output Summary (Last 24 hours) at 11/4/2022 1227  Last data filed at 11/3/2022 1700  Gross per 24 hour   Intake 120 ml   Output --   Net 120 ml      Physical Exam  Constitutional:       Appearance: Normal appearance. She is normal weight.   HENT:      Head: Normocephalic and atraumatic.      Nose: Nose normal.      Mouth/Throat:      Mouth: Mucous membranes are moist.      Pharynx: Oropharynx is clear.   Eyes:      Extraocular Movements: Extraocular movements intact.      Conjunctiva/sclera: Conjunctivae normal.      Pupils: Pupils are equal, round, and reactive to light.   Cardiovascular:      Rate and Rhythm: Normal rate and regular rhythm.      Pulses: Normal pulses.      Heart sounds: Normal heart sounds.   Pulmonary:      Effort: Pulmonary effort is normal.      Breath sounds: Normal breath sounds.   Abdominal:      General: Bowel sounds are normal.      Palpations: Abdomen is soft.      Comments: colostomy   Musculoskeletal:         General: Normal range of motion.      Cervical back: Normal range of motion and neck supple.   Skin:      General: Skin is warm and dry.      Capillary Refill: Capillary refill takes 2 to 3 seconds.   Neurological:      General: No focal deficit present.      Mental Status: She is alert and oriented to person, place, and time. Mental status is at baseline.   Psychiatric:         Mood and Affect: Mood normal.         Behavior: Behavior normal.         Thought Content: Thought content normal.         Judgment: Judgment normal.       Significant Labs: All pertinent labs within the past 24 hours have been reviewed.  BMP:   Recent Labs   Lab 11/04/22  0506      K 3.8   CO2 24   BUN 15.0   CREATININE 0.96   CALCIUM 8.9     CBC:   Recent Labs   Lab 11/04/22  0506   WBC 8.7   HGB 11.7*   HCT 37.2        CMP:   Recent Labs   Lab 11/04/22  0506      K 3.8   CO2 24   BUN 15.0   CREATININE 0.96   CALCIUM 8.9   ALBUMIN 3.4   BILITOT 0.7   ALKPHOS 58   AST 12   ALT 13     Magnesium: No results for input(s): MG in the last 48 hours.    Significant Imaging: I have reviewed all pertinent imaging results/findings within the past 24 hours.

## 2022-11-04 NOTE — HOSPITAL COURSE
11/4/22-Patient seems to be a lot better today.  She does not know why she was admitted but I discussed with her that the ED in [place] felt as though you had a SBO and needed surgical evaluation.  Final report as per the ED records just mention enteritis so I am also confused as to why she was sent here.  She denies any N/V.  She refused NG tube as well.  She has been started on a clear liquid diet as per surgery.  No consult in chart as of yet.  If she is able to tolerate the progression of the diet then she can be discharged later today.      11/4/22-patient is tolerating a full liquid diet with no difficulty.  Surgery has cleared her for discharge home as well.  Will send in cipro and flagyl for her enteritis.  She is stable for discharge home today.

## 2022-11-04 NOTE — PLAN OF CARE
11/04/22 1434   Final Note   Assessment Type Final Discharge Note   Anticipated Discharge Disposition Home   What phone number can be called within the next 1-3 days to see how you are doing after discharge? [phone]   Hospital Resources/Appts/Education Provided Provided patient/caregiver with written discharge plan information   Post-Acute Status   Patient choice form signed by patient/caregiver List with quality metrics by geographic area provided   Discharge Delays None known at this time

## 2022-11-04 NOTE — DISCHARGE SUMMARY
[facility]  Hospital Medicine  Discharge Summary      Patient Name: Nasrin Bryant  MRN: [id number]  ALLEN: [id number]  Patient Class: OP- Observation  Admission Date: 11/3/2022  Hospital Length of Stay: 0 days  Discharge Date and Time:  11/04/2022 1:35 PM  Attending Physician: Caitlyn Loya MD   Discharging Provider: Liu Montoya MD  Primary Care Provider: No primary care provider on file.    Primary Care Team: Networked reference to record PCT     HPI:   70 yo WF followed by Dr. Dhillon in [place] has a h/o colostomy and colon resection SBO x 2 once with surgery, appendectomy, hydterectomy, cholecystectomy  She presented to [place] [facility] today was with severe sharp and stabbing squeezing pain around the colostomy started around 0200, pain did not improve early am so she came to ER for evaluation.  In ER there she had CT showed possible SBO< [place] does not have surgery coverage because DR Dhillon is out of town.  [facility] transfer [facility] [place] [facility]. Upon arrival to [facility] ER DR. Roca evaluated pt and agreed with need for admission.  Since arrival pt reports nausea, her pain has gone except for a small bit, and she has had output of stool in her colostomy which is consistent with pt normal Bms.    I spoke with Dr. Long and he requested pt admit to hospital medicine and asked for pt to have NGT placed.  Pt with PMHX HTN. Plan is for conservative managmeent of her possible bowel obstruction.       * No surgery found *      Hospital Course:   11/4/22-Patient seems to be a lot better today.  She does not know why she was admitted but I discussed with her that the ED in [place] felt as though you had a SBO and needed surgical evaluation.  Final report as per the ED records just mention enteritis so I am also confused as to why she was sent here.  She denies any N/V.  She refused NG tube as  well.  She has been started on a clear liquid diet as per surgery.  No consult in chart as of yet.  If she is able to tolerate the progression of the diet then she can be discharged later today.      11/4/22-patient is tolerating a full liquid diet with no difficulty.  Surgery has cleared her for discharge home as well.  Will send in cipro and flagyl for her enteritis.  She is stable for discharge home today.       Goals of Care Treatment Preferences:  Code Status: Full Code      Consults:   Consults (From admission, onward)        Status Ordering Provider     Inpatient consult to General Surgery  Once        Provider:  Wendy Long MD    Acknowledged MILES BRADFORD          No new Assessment & Plan notes have been filed under this hospital service since the last note was generated.  Service: Hospital Medicine    Final Active Diagnoses:    Diagnosis Date Noted POA    PRINCIPAL PROBLEM:  Small bowel obstruction [K56.609] 11/03/2022 Yes    Enteritis [K52.9] 11/03/2022 Yes    HTN (hypertension) [I10] 11/03/2022 Yes      Problems Resolved During this Admission:       Discharged Condition: stable    Disposition: Home or Self Care    Follow Up:  PCP in 1 week    Patient Instructions:   No discharge procedures on file.    Significant Diagnostic Studies: Labs:   BMP:   Recent Labs   Lab 11/04/22  0506      K 3.8   CO2 24   BUN 15.0   CREATININE 0.96   CALCIUM 8.9   , CMP   Recent Labs   Lab 11/04/22  0506      K 3.8   CO2 24   BUN 15.0   CREATININE 0.96   CALCIUM 8.9   ALBUMIN 3.4   BILITOT 0.7   ALKPHOS 58   AST 12   ALT 13    and CBC   Recent Labs   Lab 11/04/22  0506   WBC 8.7   HGB 11.7*   HCT 37.2          Pending Diagnostic Studies:     None         Medications:  Reconciled Home Medications:      Medication List      START taking these medications    ciprofloxacin HCl 500 MG tablet  Commonly known as: CIPRO  Take 1 tablet (500 mg total) by mouth every 12 (twelve) hours. for 10 days      metroNIDAZOLE 500 MG tablet  Commonly known as: FLAGYL  Take 1 tablet (500 mg total) by mouth 3 (three) times daily. for 10 days        CONTINUE taking these medications    ALPRAZolam 0.25 MG tablet  Commonly known as: XANAX  Take by mouth once daily.     amLODIPine 5 MG tablet  Commonly known as: NORVASC  Take 5 mg by mouth once daily.     cyanocobalamin 1000 MCG tablet  Commonly known as: VITAMIN B-12  Take 1,000 mcg by mouth once daily. 1000 mcg/ml   Administer 1 ml IM monthly     losartan 50 MG tablet  Commonly known as: COZAAR  Take 50 mg by mouth 2 (two) times a day.     ondansetron 4 MG tablet  Commonly known as: ZOFRAN  Take 4 mg by mouth every 6 (six) hours as needed for Nausea.     vitamin D 1000 units Tab  Commonly known as: VITAMIN D3  Take 50,000 Units by mouth every 7 days.            Indwelling Lines/Drains at time of discharge:   Lines/Drains/Airways     Drain  Duration                Colostomy LLQ -- days                Time spent on the discharge of patient: 35 minutes         Liu Montoya MD  Department of Hospital Medicine  [facility]

## 2022-11-04 NOTE — NURSING
Patient requested imodium at this time; after consulting with Dr. Loya, I advised the pt that we were going to hold off on the imodium d/t SBO. Patient is having loose stool at this time which is causing some leakage around colostomy. The patient's original stomal appliance was 4in wide and she only has one bag remaining. I gave her the option to size down while also informing her that she would not be able to use her 4in appliance at home if I changed it to the 2in that we have in stock, she verbalized understanding and agreed to size down. New stomal appliance fits comfortably and without constriction.

## 2022-11-04 NOTE — PLAN OF CARE
11/04/22 0859   Discharge Assessment   Assessment Type Discharge Planning Assessment   Confirmed/corrected address, phone number and insurance Yes   Confirmed Demographics Correct on Facesheet   Source of Information patient   Communicated SAMIR with patient/caregiver Date not available/Unable to determine   Reason For Admission Surgical consult   Lives With alone   Facility Arrived From: [facility]   Do you expect to return to your current living situation? Yes   Do you have help at home or someone to help you manage your care at home? Yes   Who are your caregiver(s) and their phone number(s)? Markus gabriel   [phone]   Prior to hospitilization cognitive status: Alert/Oriented   Current cognitive status: Alert/Oriented   Walking or Climbing Stairs Difficulty none   Dressing/Bathing Difficulty none   Home Accessibility wheelchair accessible   Home Layout Able to live on 1st floor   Equipment Currently Used at Home none   Readmission within 30 days? No   Patient currently being followed by outpatient case management? No   Do you currently have service(s) that help you manage your care at home? No   Do you take prescription medications? Yes   Do you have prescription coverage? No   Do you have any problems affording any of your prescribed medications? No   Is the patient taking medications as prescribed? yes   Who is going to help you get home at discharge? María Edward  [phone] daughter   How do you get to doctors appointments? car, drives self   Are you on dialysis? No   Do you take coumadin? No   Discharge Plan A Home   Discharge Plan B  --    DME Needed Upon Discharge  none   Discharge Plan discussed with: Adult children;Patient   Discharge Barriers Identified None   Physical Activity   On average, how many days per week do you engage in moderate to strenuous exercise (like a brisk walk)? 0 days   On average, how many minutes do you engage in exercise at this level? 0 min    Financial Resource Strain   How hard is it for you to pay for the very basics like food, housing, medical care, and heating? Not hard   Housing Stability   In the last 12 months, was there a time when you were not able to pay the mortgage or rent on time? N   In the last 12 months, how many places have you lived? 1   In the last 12 months, was there a time when you did not have a steady place to sleep or slept in a shelter (including now)? N   Transportation Needs   In the past 12 months, has lack of transportation kept you from medical appointments or from getting medications? no   In the past 12 months, has lack of transportation kept you from meetings, work, or from getting things needed for daily living? No   Food Insecurity   Within the past 12 months, you worried that your food would run out before you got the money to buy more. Never true   Within the past 12 months, the food you bought just didn't last and you didn't have money to get more. Never true   Stress   Do you feel stress - tense, restless, nervous, or anxious, or unable to sleep at night because your mind is troubled all the time - these days? Only a littl   Social Connections   In a typical week, how many times do you talk on the phone with family, friends, or neighbors? More than 3   How often do you get together with friends or relatives? More than 3   How often do you attend Taoism or [employer]aritan services? More than 4   Do you belong to any clubs or organizations such as Taoism groups, unions, fraternal or athletic groups, or school groups? No   How often do you attend meetings of the clubs or organizations you belong to? Never   Are you , , , , never , or living with a partner?    Alcohol Use   Q1: How often do you have a drink containing alcohol? Never   Q2: How many drinks containing alcohol do you have on a typical day when you are drinking? None   Q3: How often do you have six or more drinks on  one occasion? Never